# Patient Record
Sex: MALE | Race: WHITE | URBAN - METROPOLITAN AREA
[De-identification: names, ages, dates, MRNs, and addresses within clinical notes are randomized per-mention and may not be internally consistent; named-entity substitution may affect disease eponyms.]

---

## 2018-12-05 PROBLEM — F41.9 ANXIETY AND DEPRESSION: Status: ACTIVE | Noted: 2018-12-05

## 2018-12-05 PROBLEM — F32.A ANXIETY AND DEPRESSION: Status: ACTIVE | Noted: 2018-12-05

## 2018-12-05 PROBLEM — R61 HYPERHIDROSIS: Status: ACTIVE | Noted: 2018-12-05

## 2018-12-06 NOTE — PROGRESS NOTES
[facility] [place]  PROGRESS NOTE  Chief Complaint:   Patient presents with:  Depression: Becks in process  Anxiety      HPI:   This is a 33year old male coming in for eval of anxiety    Pt notes fear of meetings/ stress.  Pt fears lack of accce CONSTITUTIONAL:  Denies unusual weight gain/loss, fever, chills, or fatigue. EENT:  Eyes:  Denies eye pain, visual loss, blurred vision, double vision or yellow sclerae.  Ears, Nose, Throat:  Denies hearing loss, sneezing, congestion, runny nose or sore Mouth:  No oral lesions or ulcerations, good dentition. NECK: Supple, no thyromegaly. SKIN: No rashes, no skin lesion, no bruising, good turgor. HEART:  Regular rate and rhythm, no murmurs, rubs or gallops.   LUNGS: Clear to auscultation bilterally, no

## 2018-12-06 NOTE — PATIENT INSTRUCTIONS
Fasting labs recommended    rec lexapro daily for anxiety and depression    rec counceling-- ANTONIA Mccrary    Prescriptions for deodorant for hyperhidrosis.

## 2018-12-15 ENCOUNTER — LABORATORY ENCOUNTER (OUTPATIENT)
Dept: LAB | Age: 28
End: 2018-12-15
Attending: FAMILY MEDICINE
Payer: COMMERCIAL

## 2018-12-15 DIAGNOSIS — F41.9 ANXIETY AND DEPRESSION: ICD-10-CM

## 2018-12-15 DIAGNOSIS — F32.A ANXIETY AND DEPRESSION: ICD-10-CM

## 2018-12-15 PROCEDURE — 81003 URINALYSIS AUTO W/O SCOPE: CPT | Performed by: FAMILY MEDICINE

## 2018-12-15 PROCEDURE — 80061 LIPID PANEL: CPT | Performed by: FAMILY MEDICINE

## 2018-12-15 PROCEDURE — 80050 GENERAL HEALTH PANEL: CPT | Performed by: FAMILY MEDICINE

## 2018-12-15 PROCEDURE — 36415 COLL VENOUS BLD VENIPUNCTURE: CPT | Performed by: FAMILY MEDICINE

## 2018-12-17 ENCOUNTER — TELEPHONE (OUTPATIENT)
Dept: FAMILY MEDICINE CLINIC | Facility: CLINIC | Age: 28
End: 2018-12-17

## 2018-12-17 NOTE — TELEPHONE ENCOUNTER
----- Message from Anju Marcelino MD sent at 12/16/2018 10:03 PM CST -----  Lab results reviewed  Urine, chemistry, lipids, thyroid, cbc all normal and reassuring.   Pt to f.u January as planned

## 2018-12-19 NOTE — TELEPHONE ENCOUNTER
Future Appointments   Date Time Provider Juany Natarajan   1/16/2019  5:45 PM Nazario Spencer MD [facility] [place] [facility] [place]     Pt called back, informed.

## 2019-01-14 ENCOUNTER — TELEPHONE (OUTPATIENT)
Dept: FAMILY MEDICINE CLINIC | Facility: CLINIC | Age: 29
End: 2019-01-14

## 2019-01-14 NOTE — TELEPHONE ENCOUNTER
Patient is having issues with his back and requested a call back from nurse to see if he needs to come in or any other suggestions

## 2019-01-14 NOTE — TELEPHONE ENCOUNTER
Pt states his lower back was tense and sore on Saturday, states he noticed his back felt out of place. Pt states he was very stiff when he woke up yesterday, but also was not able to move or stand well. Pt states he also is having muscle spasms.   Pt has

## 2019-01-16 ENCOUNTER — OFFICE VISIT (OUTPATIENT)
Dept: FAMILY MEDICINE CLINIC | Facility: CLINIC | Age: 29
End: 2019-01-16

## 2019-01-16 VITALS
DIASTOLIC BLOOD PRESSURE: 78 MMHG | SYSTOLIC BLOOD PRESSURE: 110 MMHG | WEIGHT: 237 LBS | RESPIRATION RATE: 18 BRPM | HEIGHT: 70.5 IN | BODY MASS INDEX: 33.55 KG/M2 | HEART RATE: 98 BPM | TEMPERATURE: 100 F | OXYGEN SATURATION: 97 %

## 2019-01-16 DIAGNOSIS — F41.9 ANXIETY AND DEPRESSION: ICD-10-CM

## 2019-01-16 DIAGNOSIS — F32.A ANXIETY AND DEPRESSION: ICD-10-CM

## 2019-01-16 DIAGNOSIS — M54.50 ACUTE BILATERAL LOW BACK PAIN WITHOUT SCIATICA: Primary | ICD-10-CM

## 2019-01-16 PROCEDURE — 99214 OFFICE O/P EST MOD 30 MIN: CPT | Performed by: FAMILY MEDICINE

## 2019-01-16 RX ORDER — NAPROXEN 500 MG/1
TABLET ORAL
Refills: 0 | COMMUNITY
Start: 2019-01-14 | End: 2019-10-01 | Stop reason: ALTCHOICE

## 2019-01-16 RX ORDER — ESCITALOPRAM OXALATE 10 MG/1
10 TABLET ORAL DAILY
Qty: 30 TABLET | Refills: 3 | Status: SHIPPED | OUTPATIENT
Start: 2019-01-16 | End: 2019-09-14

## 2019-01-16 RX ORDER — CYCLOBENZAPRINE HCL 10 MG
TABLET ORAL
Refills: 0 | COMMUNITY
Start: 2019-01-14 | End: 2019-10-01 | Stop reason: ALTCHOICE

## 2019-01-16 NOTE — PROGRESS NOTES
[address]  PROGRESS NOTE  Chief Complaint:   Patient presents with: Follow - Up      HPI:   This is a 33year old male coming in for 2 issues  Pt on med for anxiety- on lexapro Pt feeling less depressed. Better mood and interaction.  St T4   Result Value Ref Range    TSH 1.660 0.350 - 5.500 mIU/mL   URINALYSIS WITH CULTURE REFLEX   Result Value Ref Range    Urine Color Yellow Yellow    Clarity Urine Clear Clear    Spec Gravity 1.019 1.001 - 1.030    Glucose Urine Negative Negative mg/dl  school district    Tobacco Use      Smoking status: Never Smoker      Smokeless tobacco: Never Used    Substance and Sexual Activity      Alcohol use: Yes        Frequency: 2-4 times a month      Drug use: No    Family History:  F 33.53 kg/m² as calculated from the following:    Height as of this encounter: 70.5\". Weight as of this encounter: 237 lb. Vital signs reviewed. Appears stated age, well groomed.   Physical Exam:  GEN:  Patient is alert, awake and oriented, well develop MD  1/16/2019  5:52 PM    Patient/Caregiver Education: Patient/Caregiver Education: There are no barriers to learning. Medical education done. Outcome: Patient verbalizes understanding.  Patient is notified to call with any questions, complications, aller

## 2019-01-17 PROBLEM — M54.50 ACUTE BILATERAL LOW BACK PAIN WITHOUT SCIATICA: Status: ACTIVE | Noted: 2019-01-17

## 2019-01-17 NOTE — PATIENT INSTRUCTIONS
rec  continue medication for anxiety and depression    rec PT eval and treat for back pain    Ok for advil  400mg 2-3 x a day with food

## 2019-09-14 RX ORDER — ESCITALOPRAM OXALATE 10 MG/1
10 TABLET ORAL DAILY
Qty: 30 TABLET | Refills: 1 | Status: SHIPPED | OUTPATIENT
Start: 2019-09-14 | End: 2019-10-01

## 2019-09-14 NOTE — TELEPHONE ENCOUNTER
Future appt:    Last Appointment with provider:   12/5/18  Last appointment at [facility]:  Visit date not found    Escitalopram refilled on 12/5/18 #30 with on refill, and also on 1/16/19 for #30 with 3 refills    Pt due for wellness exam.  Left message

## 2019-09-14 NOTE — TELEPHONE ENCOUNTER
Vickie Perales Nurse [place]             Appointment made for Oct 1st.  Please send Rx to the [address] [place] [address] you         Pt called back, scheduled appt.     Future Appointments   Date Time Provider Juany Natarajan   10/1/2019  2:30 PM

## 2019-10-01 ENCOUNTER — OFFICE VISIT (OUTPATIENT)
Dept: FAMILY MEDICINE CLINIC | Facility: CLINIC | Age: 29
End: 2019-10-01
Payer: COMMERCIAL

## 2019-10-01 VITALS
SYSTOLIC BLOOD PRESSURE: 114 MMHG | HEART RATE: 88 BPM | RESPIRATION RATE: 16 BRPM | HEIGHT: 72 IN | TEMPERATURE: 98 F | DIASTOLIC BLOOD PRESSURE: 76 MMHG | BODY MASS INDEX: 33.51 KG/M2 | WEIGHT: 247.38 LBS

## 2019-10-01 DIAGNOSIS — F32.A ANXIETY AND DEPRESSION: ICD-10-CM

## 2019-10-01 DIAGNOSIS — F41.9 ANXIETY AND DEPRESSION: ICD-10-CM

## 2019-10-01 DIAGNOSIS — Z00.00 ANNUAL PHYSICAL EXAM: Primary | ICD-10-CM

## 2019-10-01 PROCEDURE — 99395 PREV VISIT EST AGE 18-39: CPT | Performed by: FAMILY MEDICINE

## 2019-10-01 RX ORDER — ESCITALOPRAM OXALATE 10 MG/1
10 TABLET ORAL DAILY
Qty: 90 TABLET | Refills: 3 | Status: SHIPPED | OUTPATIENT
Start: 2019-10-01 | End: 2021-11-11

## 2019-10-01 NOTE — PROGRESS NOTES
[address]  PROGRESS NOTE  Chief Complaint:   Patient presents with: Well Adult      HPI:   This is a 30year old male coming in for annual check    Last 2 weeks some increase HR-- hr  measured at home  Resolved.  Stress at work ba Urobilinogen Urine <2.0 0.2 - 2.0 mg/dL    Nitrite Urine Negative Negative    Leukocyte Esterase Urine Negative Negative    Microscopic Microscopic not indicated    CBC W/ DIFFERENTIAL   Result Value Ref Range    WBC 5.4 4.0 - 13.0 x10(3) uL    RBC 4.72 4. Outpatient Medications:  escitalopram (LEXAPRO) 10 MG Oral Tab Take 1 tablet (10 mg total) by mouth daily.  Disp: 90 tablet Rfl: 3   Aluminum Chloride 20 % External Solution Apply nightly 2-3 x a week Disp: 1 Bottle Rfl: 1      Counseling given: Not Answere awake and oriented, well developed, well nourished, no apparent distress.   HEENT:  Head:  Normocephalic, atraumatic Eyes: EOMI, PERRLA, no scleral icterus, conjunctivae clear bilaterally, no eye discharge Ears: External normal. Nose: patent, no nasal disch understanding. Patient is notified to call with any questions, complications, allergies, or worsening or changing symptoms. Patient is to call with any side effects or complications from the treatments as a result of today.

## 2020-09-08 ENCOUNTER — TELEPHONE (OUTPATIENT)
Dept: FAMILY MEDICINE CLINIC | Facility: CLINIC | Age: 30
End: 2020-09-08

## 2020-09-08 NOTE — TELEPHONE ENCOUNTER
Pt states his 4 month old son scratched his eye today when he tried to feed him. Pt states he knows s/s are directly related to that isolated incident. Pt urged to contact Abigail Govea optometrist- locally- pt did not have a provider.     Pt verbalized und

## 2021-01-07 ENCOUNTER — TELEPHONE (OUTPATIENT)
Dept: FAMILY MEDICINE CLINIC | Facility: CLINIC | Age: 31
End: 2021-01-07

## 2021-01-07 NOTE — TELEPHONE ENCOUNTER
[address] [place] [address] sent a request for medical records from the last 5 years. This was sent to [employer].

## 2021-11-11 ENCOUNTER — OFFICE VISIT (OUTPATIENT)
Dept: FAMILY MEDICINE CLINIC | Facility: CLINIC | Age: 31
End: 2021-11-11
Payer: COMMERCIAL

## 2021-11-11 ENCOUNTER — TELEPHONE (OUTPATIENT)
Dept: FAMILY MEDICINE CLINIC | Facility: CLINIC | Age: 31
End: 2021-11-11

## 2021-11-11 ENCOUNTER — LAB ENCOUNTER (OUTPATIENT)
Dept: LAB | Age: 31
End: 2021-11-11
Attending: NURSE PRACTITIONER
Payer: COMMERCIAL

## 2021-11-11 VITALS
DIASTOLIC BLOOD PRESSURE: 72 MMHG | WEIGHT: 256 LBS | RESPIRATION RATE: 18 BRPM | HEIGHT: 72.5 IN | SYSTOLIC BLOOD PRESSURE: 96 MMHG | BODY MASS INDEX: 34.3 KG/M2 | TEMPERATURE: 98 F | OXYGEN SATURATION: 97 % | HEART RATE: 102 BPM

## 2021-11-11 DIAGNOSIS — F32.A ANXIETY AND DEPRESSION: ICD-10-CM

## 2021-11-11 DIAGNOSIS — F41.9 ANXIETY AND DEPRESSION: ICD-10-CM

## 2021-11-11 DIAGNOSIS — R74.01 ELEVATED ALT MEASUREMENT: ICD-10-CM

## 2021-11-11 DIAGNOSIS — Z00.00 HEALTH CARE MAINTENANCE: Primary | ICD-10-CM

## 2021-11-11 DIAGNOSIS — Z00.00 HEALTH CARE MAINTENANCE: ICD-10-CM

## 2021-11-11 PROCEDURE — 99395 PREV VISIT EST AGE 18-39: CPT | Performed by: NURSE PRACTITIONER

## 2021-11-11 PROCEDURE — 80050 GENERAL HEALTH PANEL: CPT | Performed by: NURSE PRACTITIONER

## 2021-11-11 PROCEDURE — 3078F DIAST BP <80 MM HG: CPT | Performed by: NURSE PRACTITIONER

## 2021-11-11 PROCEDURE — 99213 OFFICE O/P EST LOW 20 MIN: CPT | Performed by: NURSE PRACTITIONER

## 2021-11-11 PROCEDURE — 3074F SYST BP LT 130 MM HG: CPT | Performed by: NURSE PRACTITIONER

## 2021-11-11 PROCEDURE — 80061 LIPID PANEL: CPT | Performed by: NURSE PRACTITIONER

## 2021-11-11 PROCEDURE — 3008F BODY MASS INDEX DOCD: CPT | Performed by: NURSE PRACTITIONER

## 2021-11-11 RX ORDER — ESCITALOPRAM OXALATE 10 MG/1
10 TABLET ORAL DAILY
Qty: 90 TABLET | Refills: 0 | Status: SHIPPED | OUTPATIENT
Start: 2021-11-11

## 2021-11-11 NOTE — PROGRESS NOTES
[facility]      HPI:   Miguel Angel Quinones is a 28year old male who presents for an Annual Health Visit. Health maintenance, wants to restart Lexapro.     Patient was on Lexapro 10 mg though stopped on his own as he was feeling bet heartburn  GENITAL/: no dysuria, urgency or frequency; no epididymal or testicular pain; no penile discharge; no hernias; no erectile dysfunction; no nocturia  MUSCULOSKELETAL: no joint complaints upper or lower extremities  NEURO: no sensory or motor co oriented x 3; affect appropriate    ASSESSMENT AND PLAN:   Deedee Figueroa  was seen today for physical.    Diagnoses and all orders for this visit:    Health care maintenance  Health maintenance, age-appropriate aspiratory guidance reviewed with patient.   Restart any questions regarding this note.

## 2021-11-11 NOTE — PATIENT INSTRUCTIONS
Fasting labs. Restart Lexapro 10 mg. At 2 weeks send [employer] message with how you're doing, if tolerating will plan to increase to 20mg at that time. Follow-up in 4 weeks either in office or a video visit for medication recheck.   Restart your exercise a

## 2021-11-11 NOTE — TELEPHONE ENCOUNTER
----- Message from GE Rush sent at 11/11/2021  5:28 PM CST -----  Fasting labs without sign of anemia. Normal thyroid.   No sign of diabetes, mild elevation of liver enzyme ALT; would recommend avoiding acetaminophen  (tylenol) containing p

## 2021-12-29 ENCOUNTER — TELEMEDICINE (OUTPATIENT)
Dept: FAMILY MEDICINE CLINIC | Facility: CLINIC | Age: 31
End: 2021-12-29
Payer: COMMERCIAL

## 2021-12-29 VITALS — HEIGHT: 72.5 IN | BODY MASS INDEX: 33.49 KG/M2 | WEIGHT: 250 LBS

## 2021-12-29 DIAGNOSIS — R68.83 CHILLS: ICD-10-CM

## 2021-12-29 DIAGNOSIS — R51.9 ACUTE NONINTRACTABLE HEADACHE, UNSPECIFIED HEADACHE TYPE: ICD-10-CM

## 2021-12-29 DIAGNOSIS — R05.9 COUGH: ICD-10-CM

## 2021-12-29 DIAGNOSIS — R53.83 FATIGUE, UNSPECIFIED TYPE: Primary | ICD-10-CM

## 2021-12-29 DIAGNOSIS — R52 BODY ACHES: ICD-10-CM

## 2021-12-29 DIAGNOSIS — J02.9 SORE THROAT: ICD-10-CM

## 2021-12-29 DIAGNOSIS — U07.1 COVID-19 VIRUS DETECTED: ICD-10-CM

## 2021-12-29 PROCEDURE — 3008F BODY MASS INDEX DOCD: CPT | Performed by: NURSE PRACTITIONER

## 2021-12-29 PROCEDURE — 99214 OFFICE O/P EST MOD 30 MIN: CPT | Performed by: NURSE PRACTITIONER

## 2021-12-29 NOTE — PATIENT INSTRUCTIONS
Ibuprofen 800mg every 8 hours if needed; take with food  May alternate with acetaminophen 650mg every 4 hours if needed  Zinc, Vitamin C, Vitamin D  Pushing fluids, hydrating  Prone positiong  Deep breathing exercises   Mucinex DM for cough  Warm water soa

## 2021-12-29 NOTE — PROGRESS NOTES
This is a telemedicine visit with live, interactive video and audio. Patient understands and accepts financial responsibility for any deductible, co-insurance and/or co-pays associated with this service.     SUBJECTIVE  Fatigue, headache, sore throat, c place, time/date and situation   Occasional nasal clearing.     ASSESSMENT & PLAN  Diagnoses and all orders for this visit:    Fatigue, unspecified type    Acute nonintractable headache, unspecified headache type    Sore throat    Cough    Body aches    Chi

## 2022-04-01 ENCOUNTER — TELEPHONE (OUTPATIENT)
Dept: FAMILY MEDICINE CLINIC | Facility: CLINIC | Age: 32
End: 2022-04-01

## 2022-04-01 NOTE — TELEPHONE ENCOUNTER
Pt advised ED  Or urgent care eval if chest pain persist, any associated symptoms as listed or rapid heart rate or palpitations.

## 2022-04-01 NOTE — TELEPHONE ENCOUNTER
Patient states he suffers from major anxiety and is starting therapy today. States recently he has been having a lot of panic attacks. States he has been going through this for a couple days now. Patient states for quite some time, he has been feeling discomfort in his chest, right over his heart. States the pain is constantly there. Does not intensify with any specific activity. Patient states he is just wanting to make sure his bp isn't high or that something else isn't going on. Patient denies having any SOB, numbness or tingling in the extremities, n/v/d, blurred vision, dizziness, headache, etc.    Advised if any of the above symptoms occur prior to his appt that he seek treatment in the ER. Patient expressed understanding and thanks.      Future Appointments   Date Time Provider Juany Natarajan   4/4/2022 10:30 AM Jose Ochoa MD [facility]

## 2022-04-01 NOTE — TELEPHONE ENCOUNTER
Pt made appt on [facility] for the following:    Patient Comments:   Heart/chest pain     Your appointments     Date & Time Appointment Department [facility])    Apr 04, 2022 10:30 AM CDT Office Visit with Dominik Mcnamara MD [address] [place] [address] ([facility])        [address] [place] [address]  [phone]        Please triage.

## 2022-04-01 NOTE — TELEPHONE ENCOUNTER
Spoke with pt. See phone note from earlier today. Pt is not having chest pain, palpitations, or rapid heart rate today. Pt declined going to ER for evaluation today. Pt scheduled a my chart appt on Monday at 10:30- which was only a 15 minute time slot. Pt appt moved to 10am on 4/4 to give appropriate time allotment.

## 2022-04-01 NOTE — TELEPHONE ENCOUNTER
Called pt to ask the TS questions which are ok but is patient ok to wait to be seen? Call sent to nurse to clarify.     Your appointments     Date & Time Appointment Department [facility])    Apr 04, 2022 10:30 AM CDT Office Visit with Christiane Perez MD [address] [place] [address], [facility] ([facility])        [address] [place] [address] [facility] [id number]  [phone]

## 2022-04-02 ENCOUNTER — PATIENT MESSAGE (OUTPATIENT)
Dept: FAMILY MEDICINE CLINIC | Facility: CLINIC | Age: 32
End: 2022-04-02

## 2022-04-04 PROBLEM — F41.9 ANXIETY: Status: ACTIVE | Noted: 2018-12-05

## 2022-04-04 PROBLEM — F33.0 MILD EPISODE OF RECURRENT MAJOR DEPRESSIVE DISORDER (HCC): Status: ACTIVE | Noted: 2022-04-04

## 2022-04-04 PROBLEM — E66.09 CLASS 1 OBESITY DUE TO EXCESS CALORIES WITHOUT SERIOUS COMORBIDITY WITH BODY MASS INDEX (BMI) OF 34.0 TO 34.9 IN ADULT: Status: ACTIVE | Noted: 2022-04-04

## 2022-04-04 PROBLEM — R74.8 ELEVATED LIVER ENZYMES: Status: ACTIVE | Noted: 2022-04-04

## 2022-04-04 RX ORDER — ESCITALOPRAM OXALATE 10 MG/1
TABLET ORAL
Qty: 90 TABLET | Refills: 0 | Status: SHIPPED | OUTPATIENT
Start: 2022-04-04 | End: 2022-04-04

## 2022-04-04 NOTE — TELEPHONE ENCOUNTER
Future appt: Your appointments     Date & Time Appointment Department [facility])    Apr 04, 2022 10:00 AM CDT  (Arrive by 9:30 AM) Office Visit with Bruce Hannah MD [address] [place] [address] ([facility])    Please arrive 15 minutes prior to your scheduled appointment. Please also bring your Insurance card, Photo ID, and your medication bottles or a list of your current medication. If your condition improves and this appointment is no longer needed, please contact your physician office to cancel. Please verify with your primary care provider if your insurance requires a referral.          [address] [place] [address]  [phone]        Last Appointment with provider: 12/29/21 telemed for covid. 11/11/21 for annual physical.  Last appointment at [facility]:  11/11/2021  Cholesterol, Total (mg/dL)   Date Value   11/11/2021 173     HDL Cholesterol (mg/dL)   Date Value   11/11/2021 32 (L)     LDL Cholesterol (mg/dL)   Date Value   11/11/2021 115 (H)     Triglycerides (mg/dL)   Date Value   11/11/2021 142     No results found for: EAG, A1C  Lab Results   Component Value Date    TSH 2.770 11/11/2021       No follow-ups on file.

## 2022-04-04 NOTE — PATIENT INSTRUCTIONS
Encourage healthy diet    Continue therapy    rec use stand desk    rec blue light    Increase escitralopram to 20 mg a day    Recheck 2 months

## 2022-04-04 NOTE — TELEPHONE ENCOUNTER
Future Appointments   Date Time Provider Juany Natarajan   4/4/2022 10:00 AM Jose Manuel Lucio MD [facility] [place] [facility]

## 2022-06-20 RX ORDER — ESCITALOPRAM OXALATE 10 MG/1
TABLET ORAL
Qty: 90 TABLET | Refills: 0 | Status: SHIPPED | OUTPATIENT
Start: 2022-06-20

## 2022-06-20 NOTE — TELEPHONE ENCOUNTER
Future Appointments   Date Time Provider Juany Mccloudi   6/24/2022 10:30 Marylu Benjamin MD [facility] [place] [facility] [place]     Please advise 1 RF as patient is currently out.

## 2022-06-20 NOTE — TELEPHONE ENCOUNTER
Future appt:    Last Appointment with provider:  4/4/2022 with Dr. Terrie Salcedo, PCP; Recheck 2 months    Last appointment at [facility]:  4/4/2022  Cholesterol, Total (mg/dL)   Date Value   11/11/2021 173     HDL Cholesterol (mg/dL)   Date Value   11/11/2021 32 (L)     LDL Cholesterol (mg/dL)   Date Value   11/11/2021 115 (H)     Triglycerides (mg/dL)   Date Value   11/11/2021 142     No results found for: EAG, A1C  Lab Results   Component Value Date    TSH 2.770 11/11/2021     Last RF:  4/4/2022    No follow-ups on file.

## 2022-06-24 ENCOUNTER — TELEPHONE (OUTPATIENT)
Dept: FAMILY MEDICINE CLINIC | Facility: CLINIC | Age: 32
End: 2022-06-24

## 2022-11-22 ENCOUNTER — TELEPHONE (OUTPATIENT)
Dept: FAMILY MEDICINE CLINIC | Facility: CLINIC | Age: 32
End: 2022-11-22

## 2022-11-22 NOTE — TELEPHONE ENCOUNTER
Left message with patient about missing his appointment, and also letting him know that there was a $40.00 no show fee he will be charged

## 2023-08-21 NOTE — TELEPHONE ENCOUNTER
Future appt:    Last Appointment with provider:   Visit date not found  Last appointment at [facility]:  Visit date not found  Last seen per Lisa Juarez NP 12/12/22- was advised then to return in 4 weeks    Sertraline- listed as external medication      Pt needs appt-  LM for pt  Cholesterol, Total (mg/dL)   Date Value   11/11/2021 173     HDL Cholesterol (mg/dL)   Date Value   11/11/2021 32 (L)     LDL Cholesterol (mg/dL)   Date Value   11/11/2021 115 (H)     Triglycerides (mg/dL)   Date Value   11/11/2021 142     No results found for: EAG, A1C  Lab Results   Component Value Date    TSH 2.770 11/11/2021       No follow-ups on file.

## 2023-08-22 NOTE — TELEPHONE ENCOUNTER
Spoke with pt. Pt states she was getting Sertraline 50mg- on-line from [facility]\"  Pt ran out of medication 3-4 days ago. Pt states it is way too expensive and he is not able to run RX via insurance. Pt informed appt is needed.     Pt scheduled with Bertin Mckeon NP Thursday    Future Appointments   Date Time Provider Juany Natarajan   8/24/2023 10:00 AM GE Armendariz [facility] [place] [facility]

## 2023-08-22 NOTE — TELEPHONE ENCOUNTER
Spoke with pt. Pt states she was getting Sertraline 50mg- on-line from [facility]  Pt ran out of medication 3-4 days ago. Pt states it is way too expensive and he is not able to run RX via insurance. Pt informed appt is needed.   RX declined    Pt scheduled with Tiffany Warren NP Thursday    Future Appointments   Date Time Provider Juany Natarajan   8/24/2023 10:00 AM GE Poole [facility]

## 2023-08-29 ENCOUNTER — TELEPHONE (OUTPATIENT)
Dept: FAMILY MEDICINE CLINIC | Facility: CLINIC | Age: 33
End: 2023-08-29

## (undated) NOTE — LETTER
01/10/22        [facility]reita  [id number] [address]      Dear [facility] ,    [address] records indicate that you have outstanding lab work and or testing that was ordered for you and has not yet been completed:  Orders Placed This Encounter